# Patient Record
Sex: FEMALE
[De-identification: names, ages, dates, MRNs, and addresses within clinical notes are randomized per-mention and may not be internally consistent; named-entity substitution may affect disease eponyms.]

---

## 2017-01-13 PROBLEM — M81.0 OSTEOPOROSIS: Status: ACTIVE | Noted: 2017-01-13

## 2017-01-13 PROCEDURE — 36415 COLL VENOUS BLD VENIPUNCTURE: CPT | Performed by: OBSTETRICS & GYNECOLOGY

## 2017-01-13 PROCEDURE — 82306 VITAMIN D 25 HYDROXY: CPT | Performed by: OBSTETRICS & GYNECOLOGY

## 2017-04-17 ENCOUNTER — CHARTING TRANS (OUTPATIENT)
Dept: OTHER | Age: 62
End: 2017-04-17

## 2017-04-17 ASSESSMENT — PAIN SCALES - GENERAL: PAINLEVEL_OUTOF10: 3

## 2018-05-31 PROCEDURE — 88377 M/PHMTRC ALYS ISHQUANT/SEMIQ: CPT | Performed by: RADIOLOGY

## 2018-05-31 PROCEDURE — 88305 TISSUE EXAM BY PATHOLOGIST: CPT | Performed by: RADIOLOGY

## 2018-05-31 PROCEDURE — 88360 TUMOR IMMUNOHISTOCHEM/MANUAL: CPT | Performed by: RADIOLOGY

## 2018-06-14 ENCOUNTER — APPOINTMENT (OUTPATIENT)
Dept: LAB | Age: 63
End: 2018-06-14
Attending: SURGERY
Payer: COMMERCIAL

## 2018-06-14 PROCEDURE — 88360 TUMOR IMMUNOHISTOCHEM/MANUAL: CPT | Performed by: SURGERY

## 2018-06-14 PROCEDURE — 88307 TISSUE EXAM BY PATHOLOGIST: CPT | Performed by: SURGERY

## 2018-06-22 PROBLEM — C50.412 MALIGNANT NEOPLASM OF UPPER-OUTER QUADRANT OF LEFT BREAST IN FEMALE, ESTROGEN RECEPTOR NEGATIVE (HCC): Status: ACTIVE | Noted: 2018-06-22

## 2018-06-22 PROBLEM — Z17.1 MALIGNANT NEOPLASM OF UPPER-OUTER QUADRANT OF LEFT BREAST IN FEMALE, ESTROGEN RECEPTOR NEGATIVE: Status: ACTIVE | Noted: 2018-06-22

## 2018-06-22 PROBLEM — Z17.1 MALIGNANT NEOPLASM OF UPPER-OUTER QUADRANT OF LEFT BREAST IN FEMALE, ESTROGEN RECEPTOR NEGATIVE (HCC): Status: ACTIVE | Noted: 2018-06-22

## 2018-06-22 PROBLEM — C50.412 MALIGNANT NEOPLASM OF UPPER-OUTER QUADRANT OF LEFT BREAST IN FEMALE, ESTROGEN RECEPTOR NEGATIVE: Status: ACTIVE | Noted: 2018-06-22

## 2018-08-17 PROBLEM — E86.0 DEHYDRATION: Status: ACTIVE | Noted: 2018-08-17

## 2018-09-19 PROBLEM — R22.1 NECK MASS: Status: ACTIVE | Noted: 2018-09-19

## 2018-09-19 PROBLEM — E04.9 THYROID GOITER: Status: ACTIVE | Noted: 2018-09-19

## 2018-09-24 ENCOUNTER — HOSPITAL ENCOUNTER (OUTPATIENT)
Dept: ULTRASOUND IMAGING | Facility: HOSPITAL | Age: 63
Discharge: HOME | End: 2018-09-24
Attending: OTOLARYNGOLOGY
Payer: COMMERCIAL

## 2018-09-24 ENCOUNTER — APPOINTMENT (OUTPATIENT)
Dept: LAB | Facility: HOSPITAL | Age: 63
End: 2018-09-24
Attending: RADIOLOGY
Payer: COMMERCIAL

## 2018-09-24 VITALS
HEIGHT: 63 IN | OXYGEN SATURATION: 93 % | DIASTOLIC BLOOD PRESSURE: 80 MMHG | RESPIRATION RATE: 16 BRPM | WEIGHT: 153 LBS | BODY MASS INDEX: 27.11 KG/M2 | SYSTOLIC BLOOD PRESSURE: 104 MMHG | HEART RATE: 94 BPM

## 2018-09-24 DIAGNOSIS — E04.9 THYROID GOITER: ICD-10-CM

## 2018-09-24 DIAGNOSIS — R22.1 NECK MASS: ICD-10-CM

## 2018-09-24 DIAGNOSIS — R22.1 NECK MASS: Primary | ICD-10-CM

## 2018-09-24 LAB
INR BLD: 1.09 (ref 0.9–1.1)
PSA SERPL DL<=0.01 NG/ML-MCNC: 14.5 SECONDS (ref 12.4–14.7)

## 2018-09-24 PROCEDURE — 20206 BIOPSY MUSCLE PERQ NEEDLE: CPT | Performed by: OTOLARYNGOLOGY

## 2018-09-24 PROCEDURE — 85610 PROTHROMBIN TIME: CPT | Performed by: RADIOLOGY

## 2018-09-24 PROCEDURE — 36415 COLL VENOUS BLD VENIPUNCTURE: CPT | Performed by: RADIOLOGY

## 2018-09-24 PROCEDURE — 11100 US BIOPSY SOFT TISSUE NECK MASS(CPT=20206/76942): CPT | Performed by: OTOLARYNGOLOGY

## 2018-09-24 PROCEDURE — 88305 TISSUE EXAM BY PATHOLOGIST: CPT | Performed by: OTOLARYNGOLOGY

## 2018-09-24 PROCEDURE — 76942 ECHO GUIDE FOR BIOPSY: CPT | Performed by: OTOLARYNGOLOGY

## 2018-09-24 NOTE — PROCEDURES
[facility]  Procedure Note    Jerline Sender Patient Status:  Outpatient    1955 MRN [id number]   Location [address] [place] [address] Attending Doreen Escalante MD   Hosp Day # 0 PCP None Pcp     Procedure: Left paratracheal mass biopsy    Pre-P

## 2018-09-28 NOTE — PROGRESS NOTES
Please call and tell her that her FNA shows normal thyroid tissue. Can await for treatment completion before discussing surgery. Follow up in 6 months.

## 2018-11-05 VITALS
HEART RATE: 85 BPM | RESPIRATION RATE: 20 BRPM | WEIGHT: 156 LBS | SYSTOLIC BLOOD PRESSURE: 126 MMHG | DIASTOLIC BLOOD PRESSURE: 80 MMHG | HEIGHT: 63 IN | TEMPERATURE: 99.5 F | OXYGEN SATURATION: 96 % | BODY MASS INDEX: 27.64 KG/M2

## 2019-04-12 PROCEDURE — 84075 ASSAY ALKALINE PHOSPHATASE: CPT | Performed by: INTERNAL MEDICINE

## 2019-04-12 PROCEDURE — 84080 ASSAY ALKALINE PHOSPHATASES: CPT | Performed by: INTERNAL MEDICINE

## 2019-04-22 PROCEDURE — 87624 HPV HI-RISK TYP POOLED RSLT: CPT | Performed by: OBSTETRICS & GYNECOLOGY

## 2019-04-22 PROCEDURE — 88175 CYTOPATH C/V AUTO FLUID REDO: CPT | Performed by: OBSTETRICS & GYNECOLOGY

## 2019-07-05 PROCEDURE — 88305 TISSUE EXAM BY PATHOLOGIST: CPT | Performed by: INTERNAL MEDICINE

## 2019-10-10 ENCOUNTER — WALK IN (OUTPATIENT)
Dept: URGENT CARE | Age: 64
End: 2019-10-10

## 2019-10-10 DIAGNOSIS — Z23 NEED FOR DIPHTHERIA-TETANUS-PERTUSSIS (TDAP) VACCINE: Primary | ICD-10-CM

## 2019-10-10 PROCEDURE — 90471 IMMUNIZATION ADMIN: CPT | Performed by: NURSE PRACTITIONER

## 2019-10-10 PROCEDURE — 90715 TDAP VACCINE 7 YRS/> IM: CPT | Performed by: NURSE PRACTITIONER

## 2019-10-10 RX ORDER — OMEPRAZOLE 40 MG/1
CAPSULE, DELAYED RELEASE ORAL
Refills: 2 | COMMUNITY
Start: 2019-10-04

## 2019-10-10 RX ORDER — ALENDRONATE SODIUM 70 MG/1
TABLET ORAL
Refills: 3 | COMMUNITY
Start: 2019-10-04

## 2019-10-10 RX ORDER — OMEPRAZOLE 40 MG/1
40 CAPSULE, DELAYED RELEASE ORAL
COMMUNITY
Start: 2018-10-08

## 2019-10-10 RX ORDER — ALENDRONATE SODIUM 70 MG/1
70 TABLET ORAL
COMMUNITY
Start: 2019-04-16

## 2020-05-26 PROBLEM — E04.9 SUBSTERNAL GOITER: Status: ACTIVE | Noted: 2018-09-19

## 2020-05-26 PROBLEM — R73.01 ELEVATED FASTING BLOOD SUGAR: Status: ACTIVE | Noted: 2020-05-26

## 2020-05-26 PROBLEM — R22.1 NECK MASS: Status: RESOLVED | Noted: 2018-09-19 | Resolved: 2020-05-26

## 2023-11-08 ENCOUNTER — OFFICE VISIT (OUTPATIENT)
Dept: HEMATOLOGY/ONCOLOGY | Facility: HOSPITAL | Age: 68
End: 2023-11-08
Attending: THORACIC SURGERY (CARDIOTHORACIC VASCULAR SURGERY)
Payer: MEDICARE

## 2023-11-08 VITALS
SYSTOLIC BLOOD PRESSURE: 151 MMHG | HEIGHT: 63.82 IN | OXYGEN SATURATION: 98 % | HEART RATE: 74 BPM | RESPIRATION RATE: 16 BRPM | WEIGHT: 163 LBS | DIASTOLIC BLOOD PRESSURE: 84 MMHG | BODY MASS INDEX: 28.17 KG/M2 | TEMPERATURE: 98 F

## 2023-11-08 DIAGNOSIS — E04.9 SUBSTERNAL GOITER: Primary | ICD-10-CM

## 2024-02-28 RX ORDER — ONDANSETRON 4 MG/1
4 TABLET, ORALLY DISINTEGRATING ORAL EVERY 8 HOURS PRN
Status: ON HOLD | COMMUNITY
Start: 2023-10-14 | End: 2024-03-15 | Stop reason: CLARIF

## 2024-02-28 RX ORDER — MULTIVIT-MIN/IRON FUM/FOLIC AC 7.5 MG-4
1 TABLET ORAL DAILY
COMMUNITY

## 2024-02-28 RX ORDER — ATORVASTATIN CALCIUM 20 MG/1
20 TABLET, FILM COATED ORAL DAILY
COMMUNITY
Start: 2023-11-07

## 2024-02-28 RX ORDER — MECLIZINE HYDROCHLORIDE 25 MG/1
1 TABLET ORAL 3 TIMES DAILY PRN
COMMUNITY
Start: 2023-10-14

## 2024-03-15 ENCOUNTER — HOSPITAL ENCOUNTER (OUTPATIENT)
Facility: HOSPITAL | Age: 69
Discharge: HOME | End: 2024-03-16
Attending: OTOLARYNGOLOGY | Admitting: OTOLARYNGOLOGY
Payer: MEDICARE

## 2024-03-15 ENCOUNTER — ANESTHESIA EVENT (OUTPATIENT)
Dept: SURGERY | Facility: HOSPITAL | Age: 69
End: 2024-03-15
Payer: MEDICARE

## 2024-03-15 ENCOUNTER — ANESTHESIA (OUTPATIENT)
Dept: SURGERY | Facility: HOSPITAL | Age: 69
End: 2024-03-15
Payer: MEDICARE

## 2024-03-15 DIAGNOSIS — E04.9 SUBSTERNAL GOITER: Primary | ICD-10-CM

## 2024-03-15 LAB
CALCIUM BLD-MCNC: 9.2 MG/DL (ref 8.5–10.1)
PTH-INTACT SERPL-MCNC: <6.3 PG/ML (ref 18.5–88)

## 2024-03-15 PROCEDURE — 0GTG0ZZ RESECTION OF LEFT THYROID GLAND LOBE, OPEN APPROACH: ICD-10-PCS | Performed by: OTOLARYNGOLOGY

## 2024-03-15 PROCEDURE — 83970 ASSAY OF PARATHORMONE: CPT | Performed by: OTOLARYNGOLOGY

## 2024-03-15 PROCEDURE — 88341 IMHCHEM/IMCYTCHM EA ADD ANTB: CPT | Performed by: OTOLARYNGOLOGY

## 2024-03-15 PROCEDURE — 88307 TISSUE EXAM BY PATHOLOGIST: CPT | Performed by: OTOLARYNGOLOGY

## 2024-03-15 PROCEDURE — 82310 ASSAY OF CALCIUM: CPT | Performed by: OTOLARYNGOLOGY

## 2024-03-15 PROCEDURE — 0GTH0ZZ RESECTION OF RIGHT THYROID GLAND LOBE, OPEN APPROACH: ICD-10-PCS | Performed by: OTOLARYNGOLOGY

## 2024-03-15 PROCEDURE — 88342 IMHCHEM/IMCYTCHM 1ST ANTB: CPT | Performed by: OTOLARYNGOLOGY

## 2024-03-15 RX ORDER — ONDANSETRON 2 MG/ML
8 INJECTION INTRAMUSCULAR; INTRAVENOUS EVERY 8 HOURS PRN
Status: DISCONTINUED | OUTPATIENT
Start: 2024-03-15 | End: 2024-03-16

## 2024-03-15 RX ORDER — HYDROCODONE BITARTRATE AND ACETAMINOPHEN 5; 325 MG/1; MG/1
1 TABLET ORAL EVERY 4 HOURS PRN
Status: DISCONTINUED | OUTPATIENT
Start: 2024-03-15 | End: 2024-03-16

## 2024-03-15 RX ORDER — ATORVASTATIN CALCIUM 20 MG/1
20 TABLET, FILM COATED ORAL NIGHTLY
Status: DISCONTINUED | OUTPATIENT
Start: 2024-03-15 | End: 2024-03-16

## 2024-03-15 RX ORDER — ONDANSETRON 2 MG/ML
4 INJECTION INTRAMUSCULAR; INTRAVENOUS EVERY 6 HOURS PRN
Status: DISCONTINUED | OUTPATIENT
Start: 2024-03-15 | End: 2024-03-15 | Stop reason: HOSPADM

## 2024-03-15 RX ORDER — HEPARIN SODIUM 5000 [USP'U]/ML
5000 INJECTION, SOLUTION INTRAVENOUS; SUBCUTANEOUS EVERY 8 HOURS SCHEDULED
Status: DISCONTINUED | OUTPATIENT
Start: 2024-03-15 | End: 2024-03-16

## 2024-03-15 RX ORDER — SODIUM CHLORIDE, SODIUM LACTATE, POTASSIUM CHLORIDE, CALCIUM CHLORIDE 600; 310; 30; 20 MG/100ML; MG/100ML; MG/100ML; MG/100ML
INJECTION, SOLUTION INTRAVENOUS CONTINUOUS
Status: DISCONTINUED | OUTPATIENT
Start: 2024-03-15 | End: 2024-03-16

## 2024-03-15 RX ORDER — HYDROCODONE BITARTRATE AND ACETAMINOPHEN 5; 325 MG/1; MG/1
2 TABLET ORAL ONCE AS NEEDED
Status: DISCONTINUED | OUTPATIENT
Start: 2024-03-15 | End: 2024-03-15 | Stop reason: HOSPADM

## 2024-03-15 RX ORDER — LEVOTHYROXINE SODIUM 112 UG/1
112 TABLET ORAL
Qty: 30 TABLET | Refills: 3 | Status: SHIPPED | OUTPATIENT
Start: 2024-03-15

## 2024-03-15 RX ORDER — LIDOCAINE HYDROCHLORIDE AND EPINEPHRINE 10; 10 MG/ML; UG/ML
INJECTION, SOLUTION INFILTRATION; PERINEURAL AS NEEDED
Status: DISCONTINUED | OUTPATIENT
Start: 2024-03-15 | End: 2024-03-15 | Stop reason: HOSPADM

## 2024-03-15 RX ORDER — CALCIUM CARBONATE 500 MG/1
1000 TABLET, CHEWABLE ORAL 3 TIMES DAILY
Status: DISCONTINUED | OUTPATIENT
Start: 2024-03-15 | End: 2024-03-16

## 2024-03-15 RX ORDER — CALCIUM CARBONATE 500 MG/1
1000 TABLET, CHEWABLE ORAL 3 TIMES DAILY
Status: DISCONTINUED | OUTPATIENT
Start: 2024-03-15 | End: 2024-03-15

## 2024-03-15 RX ORDER — ACETAMINOPHEN 500 MG
1000 TABLET ORAL ONCE AS NEEDED
Status: DISCONTINUED | OUTPATIENT
Start: 2024-03-15 | End: 2024-03-15 | Stop reason: HOSPADM

## 2024-03-15 RX ORDER — HYDROCODONE BITARTRATE AND ACETAMINOPHEN 5; 325 MG/1; MG/1
2 TABLET ORAL EVERY 4 HOURS PRN
Status: DISCONTINUED | OUTPATIENT
Start: 2024-03-15 | End: 2024-03-16

## 2024-03-15 RX ORDER — LIDOCAINE HYDROCHLORIDE 10 MG/ML
INJECTION, SOLUTION EPIDURAL; INFILTRATION; INTRACAUDAL; PERINEURAL AS NEEDED
Status: DISCONTINUED | OUTPATIENT
Start: 2024-03-15 | End: 2024-03-15 | Stop reason: SURG

## 2024-03-15 RX ORDER — ACETAMINOPHEN 325 MG/1
650 TABLET ORAL EVERY 4 HOURS PRN
Status: DISCONTINUED | OUTPATIENT
Start: 2024-03-15 | End: 2024-03-16

## 2024-03-15 RX ORDER — HYDROCODONE BITARTRATE AND ACETAMINOPHEN 5; 325 MG/1; MG/1
1 TABLET ORAL ONCE AS NEEDED
Status: DISCONTINUED | OUTPATIENT
Start: 2024-03-15 | End: 2024-03-15 | Stop reason: HOSPADM

## 2024-03-15 RX ORDER — HYDROCODONE BITARTRATE AND ACETAMINOPHEN 5; 325 MG/1; MG/1
TABLET ORAL
Status: COMPLETED
Start: 2024-03-15 | End: 2024-03-15

## 2024-03-15 RX ORDER — NALOXONE HYDROCHLORIDE 0.4 MG/ML
0.08 INJECTION, SOLUTION INTRAMUSCULAR; INTRAVENOUS; SUBCUTANEOUS AS NEEDED
Status: DISCONTINUED | OUTPATIENT
Start: 2024-03-15 | End: 2024-03-15 | Stop reason: HOSPADM

## 2024-03-15 RX ORDER — HYDROCODONE BITARTRATE AND ACETAMINOPHEN 5; 325 MG/1; MG/1
1 TABLET ORAL EVERY 6 HOURS PRN
Qty: 20 TABLET | Refills: 0 | Status: SHIPPED | OUTPATIENT
Start: 2024-03-15

## 2024-03-15 RX ORDER — HYDROMORPHONE HYDROCHLORIDE 1 MG/ML
0.4 INJECTION, SOLUTION INTRAMUSCULAR; INTRAVENOUS; SUBCUTANEOUS EVERY 5 MIN PRN
Status: DISCONTINUED | OUTPATIENT
Start: 2024-03-15 | End: 2024-03-15 | Stop reason: HOSPADM

## 2024-03-15 RX ORDER — HYDROMORPHONE HYDROCHLORIDE 1 MG/ML
0.2 INJECTION, SOLUTION INTRAMUSCULAR; INTRAVENOUS; SUBCUTANEOUS EVERY 5 MIN PRN
Status: DISCONTINUED | OUTPATIENT
Start: 2024-03-15 | End: 2024-03-15 | Stop reason: HOSPADM

## 2024-03-15 RX ORDER — ALBUTEROL SULFATE 2.5 MG/3ML
2.5 SOLUTION RESPIRATORY (INHALATION) AS NEEDED
Status: DISCONTINUED | OUTPATIENT
Start: 2024-03-15 | End: 2024-03-15 | Stop reason: HOSPADM

## 2024-03-15 RX ORDER — LABETALOL HYDROCHLORIDE 5 MG/ML
5 INJECTION, SOLUTION INTRAVENOUS EVERY 5 MIN PRN
Status: DISCONTINUED | OUTPATIENT
Start: 2024-03-15 | End: 2024-03-15 | Stop reason: HOSPADM

## 2024-03-15 RX ORDER — DEXAMETHASONE SODIUM PHOSPHATE 4 MG/ML
VIAL (ML) INJECTION AS NEEDED
Status: DISCONTINUED | OUTPATIENT
Start: 2024-03-15 | End: 2024-03-15 | Stop reason: SURG

## 2024-03-15 RX ORDER — PANTOPRAZOLE SODIUM 20 MG/1
20 TABLET, DELAYED RELEASE ORAL
Status: DISCONTINUED | OUTPATIENT
Start: 2024-03-15 | End: 2024-03-16

## 2024-03-15 RX ORDER — ACETAMINOPHEN 500 MG
1000 TABLET ORAL ONCE
Status: DISCONTINUED | OUTPATIENT
Start: 2024-03-15 | End: 2024-03-15 | Stop reason: HOSPADM

## 2024-03-15 RX ORDER — SODIUM CHLORIDE, SODIUM LACTATE, POTASSIUM CHLORIDE, CALCIUM CHLORIDE 600; 310; 30; 20 MG/100ML; MG/100ML; MG/100ML; MG/100ML
INJECTION, SOLUTION INTRAVENOUS CONTINUOUS
Status: DISCONTINUED | OUTPATIENT
Start: 2024-03-15 | End: 2024-03-15 | Stop reason: HOSPADM

## 2024-03-15 RX ORDER — HYDROMORPHONE HYDROCHLORIDE 1 MG/ML
0.6 INJECTION, SOLUTION INTRAMUSCULAR; INTRAVENOUS; SUBCUTANEOUS EVERY 5 MIN PRN
Status: DISCONTINUED | OUTPATIENT
Start: 2024-03-15 | End: 2024-03-15 | Stop reason: HOSPADM

## 2024-03-15 RX ORDER — LEVOTHYROXINE SODIUM 0.1 MG/1
100 TABLET ORAL
Status: DISCONTINUED | OUTPATIENT
Start: 2024-03-15 | End: 2024-03-16

## 2024-03-15 RX ADMIN — LIDOCAINE HYDROCHLORIDE 50 MG: 10 INJECTION, SOLUTION EPIDURAL; INFILTRATION; INTRACAUDAL; PERINEURAL at 11:05:00

## 2024-03-15 RX ADMIN — DEXAMETHASONE SODIUM PHOSPHATE 4 MG: 4 MG/ML VIAL (ML) INJECTION at 11:26:00

## 2024-03-15 RX ADMIN — SODIUM CHLORIDE, SODIUM LACTATE, POTASSIUM CHLORIDE, CALCIUM CHLORIDE: 600; 310; 30; 20 INJECTION, SOLUTION INTRAVENOUS at 12:42:00

## 2024-03-15 NOTE — H&P
History and physical by Dr. Anderson on 3/1 reviewed and up to date. No changes to H&P.    Plan is total thyroidectomy

## 2024-03-15 NOTE — ANESTHESIA PROCEDURE NOTES
Airway  Date/Time: 3/15/2024 11:08 AM  Urgency: elective      General Information and Staff    Patient location during procedure: OR  Anesthesiologist: Linette Summers MD  Performed: anesthesiologist   Performed by: Linette Summers MD  Authorized by: Linette Summers MD      Indications and Patient Condition  Indications for airway management: anesthesia  Sedation level: deep  Preoxygenated: yes  Patient position: sniffing  Mask difficulty assessment: 1 - vent by mask    Final Airway Details  Final airway type: endotracheal airway      Successful airway: ETT and NIM tube  Cuffed: yes   Successful intubation technique: direct laryngoscopy  Endotracheal tube insertion site: oral  Blade size: #3  ETT size (mm): 6.0    Cormack-Lehane Classification: grade IIB - view of arytenoids or posterior of glottis only  Placement verified by: capnometry   Measured from: lips  ETT to lips (cm): 21  Number of attempts at approach: 1

## 2024-03-15 NOTE — PROGRESS NOTES
NURSING ADMISSION NOTE      Patient admitted via  bed.  Oriented to room.  Safety precautions initiated.  Bed in low position.  Call light in reach.

## 2024-03-15 NOTE — ANESTHESIA POSTPROCEDURE EVALUATION
[facility]    Myra Stevens Patient Status:  Outpatient in a Bed   Age/Gender 68 year old female MRN [id number]   Location [facility] POST ANESTHESIA CARE UNIT Attending Vick Adam MD   Hosp Day # 0 PCP Celia Anderson MD       Anesthesia Post-op Note    TOTAL THYROIDECTOMY WITH NERVE MONITOR    Procedure Summary       Date: 03/15/24 Room / Location:  MAIN OR 03 / [facility] MAIN OR    Anesthesia Start: 1100 Anesthesia Stop: 1244    Procedure: TOTAL THYROIDECTOMY WITH NERVE MONITOR Diagnosis: (NECK MASS, SUBSTERNAL GOITER)    Surgeons: Vick Adam MD Anesthesiologist: Linette Summers MD    Anesthesia Type: general ASA Status: 2            Anesthesia Type: general    Vitals Value Taken Time   /82 03/15/24 1248   Temp 97 03/15/24 1249   Pulse 75 03/15/24 1248   Resp 18 03/15/24 1248   SpO2 97 % 03/15/24 1248   Vitals shown include unfiled device data.    Patient Location: PACU    Anesthesia Type: general    Airway Patency: patent and extubated    Postop Pain Control: adequate    Nausea/Vomiting: none    Cardiopulmonary/Hydration status: stable euvolemic    Complications: no apparent anesthesia related complications    Postop vital signs: stable    Comments: Report given to PACU nurse Letty.     Dental Exam: Unchanged from Preop    Patient to be discharged from PACU when criteria met.

## 2024-03-15 NOTE — OPERATIVE REPORT
[facility]    Myra Stevens Patient Status:  Outpatient in a Bed    1955 MRN [id number]   Location [facility] SURGERY Attending Vick Adam MD   Hosp Day # 0 PCP Celia Anderson MD     Thyroid Op Note  Pre-Op Diagnosis:  left substernal Thyroid noduler   Post-Op Diagnosis:  Same  Procedure:   Total thyroidectomy  Surgeon: Kia  Assistant:  Scarlett Gomez  Anesthesia: General  Indications for Procedure:  Myra is a very pleasant female with a history of large left substernal nodule.  The above-named procedure was offered for definitive treatment.   Procedure in Detail:  Patient taken to the operating room and laid supine on the operating table.   A NIMS monitoring ET tube was used during the procedure for laryngeal nerve monitoring.  After adequate IV and endotracheal anesthesia, the table was brought down slightly.  A shoulder roll was placed.  The patient was prepped and draped in standard fashion.  The cricoid cartilage, sternal notch and thyroid notch were palpated and outlined with a surgical marker.  Approximately 5cc of 1% lidocaine with epinephrine was injected.   A curvilinear skin incision was made two fingerbreadths above the sternum.  The subfascial and subcutaneous tissues were dissected with electrobovie cautery.  The anterior layer of the deep cervical fascia was identified.  Electrobovie cautery was used to incise at the midline.  The median raphe was identified and the strap muscles were retracted laterally.  The left thyroid lobe was visualized.  Using blunt dissection, all remaining strap musculature was retracted laterally exposing only the thyroid.  The superior portion of the lobe was retracted inferiorly and medially.  With blunt dissection, the superior pole vasculature was visualized.  These vessels were individually clipped for hemostasis.  The middle thyroid vein was then bluntly dissected and clipped.  This allowed for further medial retraction of the thyroid lobe.   Using the inferior thyroid artery for landmarks, the recurrent laryngeal nerve was identified.  All thyroid tissue was elevated off the trachea while keeping visualization of the RLN.  The substernal nodule was blunt and finger dissected free.  The same procedure was done on the right lobe.  Hemostasis was achieved.  The RLN was stimulated on both sides and intact.  The midline straps were reapproximated with interupted vicryls and the platysma and sub Q closed with vicryls.  The skin was closed with a running monocryl suture and mastisol and steristrips used for dressing.  All instruments were removed and the patient was given back to anesthesia.   The sponge, needle and instrument counts were correct at the end of the case.  There were no complications.  I performed all parts of this procedure.  EBL:  20 cc  IVF:  100cc LR  Specimens:  total thyroid   UO: None  Condition:  To PACU stable  Vick Adam MD  3/15/2024  12:27 PM

## 2024-03-15 NOTE — ANESTHESIA PREPROCEDURE EVALUATION
PRE-OP EVALUATION    Patient Name: Myra Stevens    Admit Diagnosis: NECK MASS, SUBSTERNAL GOITER    Pre-op Diagnosis: NECK MASS, SUBSTERNAL GOITER    TOTAL THYROIDECTOMY WITH NERVE MONITOR    Anesthesia Procedure: TOTAL THYROIDECTOMY WITH NERVE MONITOR    Surgeon(s) and Role:     * Vick Adam MD - Primary     * Franco Holt Jr., MD - Assisting Surgeon    Pre-op vitals reviewed.  Temp: 98.1 °F (36.7 °C)  Pulse: 80  Resp: 16  BP: 135/81  SpO2: 97 %  Body mass index is 27.81 kg/m².    Current medications reviewed.  Hospital Medications:   acetaminophen (Tylenol Extra Strength) tab 1,000 mg  1,000 mg Oral Once    lactated ringers infusion   Intravenous Continuous       Outpatient Medications:     Medications Prior to Admission   Medication Sig Dispense Refill Last Dose    atorvastatin 20 MG Oral Tab Take 1 tablet (20 mg total) by mouth daily.       meclizine 25 MG Oral Tab Take 1 tablet (25 mg total) by mouth 3 (three) times daily as needed for Dizziness.       ondansetron 4 MG Oral Tablet Dispersible Take 1 tablet (4 mg total) by mouth every 8 (eight) hours as needed.       Multiple Vitamins-Minerals (MULTI-VITAMIN/MINERALS) Oral Tab Take 1 tablet by mouth daily.       Calcium Carb-Cholecalciferol (CALCIUM 1000 + D OR) Take by mouth.       Multiple Vitamins-Minerals (HAIR SKIN & NAILS OR) Take by mouth.       Misc Natural Products (FOCUSED MIND OR) Take by mouth.       alendronate 70 MG Oral Tab Take 1 tablet (70 mg total) by mouth once a week. 12 tablet 3     OMEPRAZOLE 40 MG Oral Capsule Delayed Release TAKE 1 CAPSULE(40 MG) BY MOUTH TWICE DAILY BEFORE MEALS 60 capsule 3        Allergies: Codeine and Opioid analgesics      Anesthesia Evaluation        Anesthetic Complications  (+) history of anesthetic complications  History of: PONV       GI/Hepatic/Renal      (+) GERD                           Cardiovascular  Comment: Stress echo 06/2020  Summary:     1. Stress ECG conclusions: The stress ECG is  negative for ischemia.   2. Stress echo: There is no evidence for stress-induced ischemia.     Echo 2020  Summary:     1. Left ventricle: The cavity size is normal. Wall thickness is at the upper      limits of normal. Systolic function is normal. The estimated ejection      fraction is 55-60%. Wall motion is normal; there are no regional wall      motion abnormalities. Doppler parameters are consistent with abnormal      left ventricular relaxation (grade 1 diastolic dysfunction).   2. Aortic valve: Trileaflet; mildly thickened, mildly calcified leaflets.      Peak velocity (S): 1.4m/sec.   3. Right ventricle: Estimation of the right ventricular systolic pressure is      within the normal range.   4. Pericardium, extracardiac: There is no significant pericardial effusion.         Exercise tolerance: good     MET: >4         (+) hyperlipidemia                                  Endo/Other                                  Pulmonary                           Neuro/Psych    Negative neuro/psych ROS.                                  Past Surgical History:   Procedure Laterality Date          x1    CHEMOTHERAPY  2018    COLONOSCOPY  2019    MAC: diverticulosis, int hem, repeat     CONIZATION CERVIX,KNIFE/LASER      EGD  2019    MAC: LA grade A esophagitis, gastritis, fundic gland polyps, HP negative    LUMPECTOMY LEFT  2018    OTHER      port a cath    RADIATION LEFT  2019     Social History     Socioeconomic History    Marital status:    Tobacco Use    Smoking status: Never    Smokeless tobacco: Never   Vaping Use    Vaping Use: Never used   Substance and Sexual Activity    Alcohol use: Yes     Alcohol/week: 0.0 standard drinks of alcohol     Comment: 1-2/month    Drug use: No    Sexual activity: Not Currently     Partners: Male     History   Drug Use No     Available pre-op labs reviewed.               Airway      Mallampati: II  Mouth opening: 3 FB  TM distance: 4 - 6 cm  Neck  ROM: full Cardiovascular      Rhythm: regular  Rate: normal     Dental  Comment: Denies any loose or chipped teeth.            Pulmonary    Pulmonary exam normal.                 Other findings              ASA: 2   Plan: general  NPO status verified and patient meets guidelines.    Post-procedure pain management plan discussed with surgeon and patient.    Comment: Options, risks and benefits of anesthesia as outlined in the anesthesia consent were reviewed with the patient. Risks and benefits of GA including sore throat, allergy, nausea, vomiting, dental trauma, pain management modalities were all discussed. Particularly the risk of dental trauma with weakened teeth or crowns, partials, fillings and any non natural teeth due to instrumentation of oral cavity and airway. Patient understands risks and verbally agreed to proceed. All questions answered.The consent was signed without further questions.        Plan/risks discussed with: patient  Use of blood product(s) discussed with: patient    Consented to blood products.          Present on Admission:  **None**

## 2024-03-15 NOTE — DISCHARGE INSTRUCTIONS
Call [facility] at [phone] or if it is after hours ask to have the doctor on call paged or go to the nearest Emergency Room if you have:    * Any worsening neck swelling or redness  * Any difficulty or changes to your breathing.  * A temperature greater than 102F  * Vomiting that lasts more than 24 hours  * Severe pain that gets worse and is not helped by medicine  * Coughing that will not go away  * Neck pain, stiffness or has a hard time turning their head    Call with any other questions or concerns    Appointments you need to make:  You should make a follow up appointment for 2 weeks after surgery.    What to expect:  * You will have some neck stiffness and soreness  * This should improve over the next couple of days.      Pain:  * Your may have acetaminophen (Tylenol) every 4 to 6 hours   * Your doctor may prescribe stronger pain medicine, follow your doctor's instructions for taking pain medicines  * If your doctor gives you a stronger pain medicine (roxicet or lortab), please remember that these medications contain acetaminophen (Tylenol) already.  So please do NOT give Roxicet/Lortab and additional acetaminophen (tylenol) at the SAME time.  * If you need additional pain medication, please call the nursing line at [phone]    Medication:  * Norco:  Take 1-2 tabs every 4-6 hours as needed for pain  * Colace:  Take 1 tab twice a day for stool softening while you are taking the Norco.    Diet:  * Start with clear liquids (flat white soda, water, broth, apple juice, and popsicles)  * If you do not have an upset stomach when fully awake from surgery, a soft diet can be started. Avoid spicy, acidic or rough foods (includes toast, crackers, and potato chips)      Activity:  * Recovery takes 1-2 weeks.  Avoid rough play, gym, swimming, and contact sports during this time  * Avoid heavy lifting or straining until you are seen in clinic for follow up  * You can bathe from the neck down starting  today.  Wait 3 days before getting the incision wet.  The steristrips will fall off on their own.  Do not remove them.      With any concerns or questions, or ANY bleeding, call and ask for the ENT on call physician

## 2024-03-16 VITALS
TEMPERATURE: 98 F | DIASTOLIC BLOOD PRESSURE: 79 MMHG | HEIGHT: 64 IN | BODY MASS INDEX: 27.66 KG/M2 | HEART RATE: 66 BPM | SYSTOLIC BLOOD PRESSURE: 133 MMHG | WEIGHT: 162 LBS | OXYGEN SATURATION: 97 % | RESPIRATION RATE: 18 BRPM

## 2024-03-16 LAB
CALCIUM BLD-MCNC: 8.6 MG/DL (ref 8.5–10.1)
CALCIUM BLD-MCNC: 9 MG/DL (ref 8.5–10.1)

## 2024-03-16 PROCEDURE — 82310 ASSAY OF CALCIUM: CPT | Performed by: OTOLARYNGOLOGY

## 2024-03-16 RX ORDER — CALCIUM CARBONATE 500 MG/1
TABLET, CHEWABLE ORAL
Qty: 100 TABLET | Refills: 0 | Status: SHIPPED | OUTPATIENT
Start: 2024-03-16

## 2024-03-16 NOTE — PLAN OF CARE
Problem: Patient/Family Goals  Goal: Patient/Family Long Term Goal  Description: Patient's Long Term Goal: Discharge    Interventions:  - IVF  -Advance diet as tolerated  -Monitor calcium level  - See additional Care Plan goals for specific interventions  Outcome: Adequate for Discharge  Goal: Patient/Family Short Term Goal  Description: Patient's Short Term Goal: Comfort    Interventions:   - Pain meds as needed  - See additional Care Plan goals for specific interventions  Outcome: Adequate for Discharge

## 2024-03-16 NOTE — PLAN OF CARE
Pt presents ao x4. Anterior neck incision with ster strips cdi. Pt denies any numbness,tingling or muscle cramps. Tolerating diet. Discharge instructions given and pt verbalized understanding.

## 2024-03-16 NOTE — PROGRESS NOTES
Pt with VSS on room air  Denies nausea and tolerating diet  Steri strips dressing at anterior neck incision, cdi, applying ice at site and tylenol prn for mild pain  Up ad ann, voids adequately  Poc discussed, call light in reach

## 2024-03-16 NOTE — DISCHARGE SUMMARY
Surgeon Discharge Summary     Patient ID:  Myra Stevens  [id number]  68 year old  5/29/1955    Admit date: 3/15/2024    Discharge date and time: 3/16/24    Attending Physician: Vick Adam MD     Reason for admission: Substernal goiter    Procedures: total thyroidectomy    Hospital Course: Procedures performed with no complications.  Observed overnight with no issues.      Discharge Diagnoses: NECK MASS, SUBSTERNAL GOITER  Substernal goiter    Discharged Condition: good    Disposition: home    Signed:  Vick Adam MD  3/16/2024  10:27 AM

## 2024-03-16 NOTE — PROGRESS NOTES
[facility]  Progress Note    Myra Stevens Patient Status:  Outpatient in a Bed    1955 MRN [id number]   Location [facility] 3NW-A Attending Vick Adam MD   Hosp Day # 0 PCP Celia Anderson MD     Subjective:  Pt doing well.  Swallowing going well.  No perioral numbness or tingling.  No hand cramping.      Objective/Physical Exam:    General: Alert, orientated x3.  Cooperative.  No apparent distress.  Vital Signs:  Blood pressure 133/79, pulse 66, temperature 97.7 °F (36.5 °C), temperature source Oral, resp. rate 18, height 5' 4\" (1.626 m), weight 162 lb (73.5 kg), last menstrual period 10/20/2001, SpO2 97%, not currently breastfeeding.  Gen:  AAO x 3, NAD.  Pain well controlled  Neuro:  PERRLA, EOMI, CN II-XII grossly full and intact, no deficits.   OC/OP:  No lesions seen, moist mucosa  Neck:  No tenderness to palpation, no lymphadenopathy palpable, no thyroid nodules noted  Wound:  C/d/i.  Sutures intact    Labs:  Lab Results   Component Value Date    CA 9.0 2024       Assessment/Plan:  Patient Active Problem List   Diagnosis    GERD (gastroesophageal reflux disease)    Osteoporosis    Malignant neoplasm of upper-outer quadrant of left breast in female, estrogen receptor negative (HCC)    Dehydration    Substernal goiter    Elevated fasting blood sugar       POD#1 s/p total thyroid    Doing well  Home today  Home on synthroid, norco and tums      Time spent on counseling/coordination of care:  15 Minutes  Total time spent with patient:  30 Minutes    Vick Adam MD  3/16/2024  10:25 AM

## (undated) DEVICE — PAD SACRAL SPAN AID

## (undated) DEVICE — SPONGE: SPECIALTY PEANUT XR 100/CS: Brand: MEDICAL ACTION INDUSTRIES

## (undated) DEVICE — SOLUTION IRRIG 1000ML 0.9% NACL USP BTL

## (undated) DEVICE — ELECTRODE ES 2.75IN PTFE BLDE MOD E-Z CLN

## (undated) DEVICE — EMG TUBE 8229706 NIM TRIVANTAGE 6.0MM ID: Brand: NIM TRIVANTAGE™

## (undated) DEVICE — PROBE 8225101 5PK STD PRASS FL TIP ROHS

## (undated) DEVICE — HOOK RETRCT BLDE L5MM E STAY BLNT LONE STAR

## (undated) DEVICE — SLEEVE COMPR MD KNEE LEN SGL USE KENDALL SCD

## (undated) DEVICE — SUT SLK 2-0 30IN MULTPK BK

## (undated) DEVICE — YANKAUER,FLEXIBLE HANDLE,FINE CAPACITY: Brand: MEDLINE

## (undated) DEVICE — HEAD AND NECK CDS-LF: Brand: MEDLINE INDUSTRIES, INC.

## (undated) DEVICE — 3M™ STERI-STRIP™ REINFORCED ADHESIVE SKIN CLOSURES, R1547, 1/2 IN X 4 IN (12 MM X 100 MM), 6 STRIPS/ENVELOPE: Brand: 3M™ STERI-STRIP™

## (undated) DEVICE — CABLE BPLR L12FT FLYING LD DISP

## (undated) DEVICE — LIGASURE EXACT DISSECTOR: Brand: LIGASURE

## (undated) DEVICE — APPLICATOR PREP 10.5ML ORNG CHG 2% ISO ALC

## (undated) DEVICE — VIOLET BRAIDED (POLYGLACTIN 910), SYNTHETIC ABSORBABLE SUTURE: Brand: COATED VICRYL

## (undated) DEVICE — SUT MCRYL 4-0 18IN PS-2 ABSRB UD 19MM 3/8 CIR

## (undated) DEVICE — GLOVE SUR 7.5 SENSICARE PI PIP CRM PWD F

## (undated) NOTE — Clinical Note
Saw your lady. You can have your people talk to my people about a date.  My office back number is [phone] Kraig Valadez